# Patient Record
Sex: MALE | Race: WHITE | NOT HISPANIC OR LATINO | Employment: STUDENT | URBAN - METROPOLITAN AREA
[De-identification: names, ages, dates, MRNs, and addresses within clinical notes are randomized per-mention and may not be internally consistent; named-entity substitution may affect disease eponyms.]

---
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
## 2022-08-24 NOTE — DISCHARGE INSTRUCTIONS
You were seen in the ED today for a head laceration  Your lac was repaired with glue  You can shower with the glue on but put a bandaid over the laceration before showering  You should change your bandage daily  Please return to the ED if you develop fevers, notice pus drainage, or you develop confusion or loss of consciousness  Detail Level: Zone Detail Level: Generalized